# Patient Record
Sex: MALE | URBAN - METROPOLITAN AREA
[De-identification: names, ages, dates, MRNs, and addresses within clinical notes are randomized per-mention and may not be internally consistent; named-entity substitution may affect disease eponyms.]

---

## 2019-03-18 ENCOUNTER — HOSPITAL ENCOUNTER (OUTPATIENT)
Dept: PHYSICAL THERAPY | Age: 74
Discharge: HOME | End: 2019-03-18
Payer: MEDICARE

## 2019-03-18 PROCEDURE — 97163 PT EVAL HIGH COMPLEX 45 MIN: CPT

## 2019-03-18 PROCEDURE — 97112 NEUROMUSCULAR REEDUCATION: CPT

## 2019-03-18 NOTE — THERAPY EVALUATION
Reg Aparicio  : 1945  [place]  [place] [address] at [place] [address] [facility], [address] [place] [address],[address], [place] [address].  [phone]   [phone]        OUTPATIENT PHYSICAL THERAPY:Initial Assessment 3/18/2019   ICD-10: Treatment Diagnosis: Difficulty in walking, not elsewhere classified (R26.2)  Precautions/Allergies:   Patient has no allergy information on record. MD Orders: Evaluate and treat  MEDICAL/REFERRING DIAGNOSIS:  Dizziness and giddiness [R42]   DATE OF ONSET: Several months ago   REFERRING PHYSICIAN: Baldemar Bradshaw MD  RETURN PHYSICIAN APPOINTMENT: unknown      INITIAL ASSESSMENT:  Mr. Che Benavides presents with vertigo, imbalance, and difficulty walking. Patient is at higher fall risk with daily activities based on score received on Dynamic Gait Index. Patient would benefit from skilled physical therapy to address problems and goals. Thank you for this referral.     PROBLEM LIST (Impacting functional limitations):  1. Decreased Ambulation Ability/Technique  2. Decreased Balance  3. Increased vertigo INTERVENTIONS PLANNED: (Treatment may consist of any combination of the following)  1. Balance Exercise  2. Gait Training  3. Home Exercise Program (HEP)  4. Habituation exercises   TREATMENT PLAN:  Effective Dates: 3/18/2019 TO 2019 (90 days). Frequency/Duration: 1-2 times a week for 90 Day(s)  GOALS: (Goals have been discussed and agreed upon with patient.)  Short-Term Functional Goals: Time Frame: 30 days   1. Patient will be independent with home exercise program to improve balance and decrease vertigo. Discharge Goals: Time Frame: 90 days   1. Patient will increase score on Dynamic Gait Index to greater than or equal to 22/24 demonstrating improved balance and decreased fall risk with daily activities.     2. Patient will have no falls on condition 5 of SMART Equitest Sensory Organization test indicating improved vestibular function. 3. Patient will report improved stability with daily activities. OUTCOME MEASURE:   Tool Used: Dynamic Gait Index  Score:  Initial: 19/24 Most Recent: X/24 (Date: -- )   Interpretation of Score: Each section is scored on a 0-3 scale, 0 representing the patients inability to perform the task and 3 representing independence. The scores of each section are added together for a total score of 24. Any score below 19 indicates increased risk for falls. MEDICAL NECESSITY:   · Patient is expected to demonstrate progress in balance and vertigo to improve safety during activities of daily living. REASON FOR SERVICES/OTHER COMMENTS:  · Patient continues to require skilled intervention due to higher fall risk with daily activities. Total Duration:  PT Patient Time In/Time Out  Time In: 0900  Time Out: 0945    Rehabilitation Potential For Stated Goals: Excellent  Regarding Lucila Soria's therapy, I certify that the treatment plan above will be carried out by a therapist or under their direction. Thank you for this referral,  Molly Andersen, PT     Referring Physician Signature: Vinny Byers MD _______________________________ Date _____________     PAIN/SUBJECTIVE:   Initial: Pain Intensity 1: 0  Post Session:  0/10   HISTORY:   History of Injury/Illness (Reason for Referral):  Patient has Meniere's disease. Patient reports having a flare up over the past couple of months. Patient has had no falls. Patient uses no assistive device for ambulation. Patient reports feeling like he is \"falling down\". Patient rates current dizziness as 0/10. Patient complains mainly of imbalance. Patient reports tinnitus in left ear. Patient enjoys fly fishing. Past Medical History/Comorbidities:   Peripheral neuropathy, HTN, thyroid, kidney stone removed, blood clot removed. Social History/Living Environment:     Lives with family. Lives in a one story home.   Patient has three steps with a railing to enter. Prior Level of Function/Work/Activity:  Independent. Retired  Dominant Side:         RIGHT  Personal Factors:          Sex:  male        Age:  68 y.o. Ambulatory/Rehab Services H2 Model Falls Risk Assessment   Risk Factors:       (1)  Dizziness/Vertigo Ability to Rise from Chair:       (0)  Ability to rise in a single movement   Falls Prevention Plan:       No modifications necessary   Total: (5 or greater = High Risk): 1   ©2010 [facility] [employer]. All Rights Reserved. [facility] in the [place] Patent #[id number]. Federal Law prohibits the replication, distribution or use without written permission from [facility] [employer]   Current Medications:     Synthroid, HCTZ, blood pressure medication, and statin drug. Date Last Reviewed:  3/18/2019   Number of Personal Factors/Comorbidities that affect the Plan of Care: 3+: HIGH COMPLEXITY   EXAMINATION:   Postural Control & Balance:  · Rizzo Balance Scale:  Not tested. · Dynamic Gait Index:  19/24.   (A score less than or equal to19/24 is abnormal and predictive of falls)     · Unblab Sensory Organization Test:  Objective findings indicate Normal use of somatosensory, Normal use of visual, & Decreased use of vestibular inputs to balance. Center of gravity is normal.  See scanned report. Patient fell on condition 5 of this test.    · Sensation: Decreased left foot. Oculomotor Exam:  · Eye Range of Motion:  full range of motion  · Cervical Range of Motion:   full range of motion  · Spontaneous nystagmus:  NO  · Gaze holding nystagmus:  NO  · Smooth Pursuit:      []Smooth Eye Movements    []Delayed Eye Movements     [x]Within Normal Limits     []Other(comment): · Voluntary Saccades:      []Smooth Eye Movements    []Delayed Eye Movements     [x]Within Normal Limits     []Other(comment):   Vestibular Ocular Reflex Testing:  · Dynamic Visual Acuity Test:  Not tested. · Head Thrust Test: Negative to the right.  Negative to the left. Body Structures Involved:  1. Nerves  2. Eyes and Ears  3. Muscles Body Functions Affected:  1. Neuromusculoskeletal Activities and Participation Affected:  1. General Tasks and Demands  2. Mobility  3. Interpersonal Interactions and Relationships  4.  Community, Social and [place]   Number of elements (examined above) that affect the Plan of Care: 4+: HIGH COMPLEXITY   CLINICAL PRESENTATION:   Presentation: Evolving clinical presentation with unstable and unpredictable characteristics: HIGH COMPLEXITY   CLINICAL DECISION MAKING:   Use of outcome tool(s) and clinical judgement create a POC that gives a: Difficult prediction of patient's progress: HIGH COMPLEXITY

## 2019-03-18 NOTE — PROGRESS NOTES
Anand Camera  : 1945  [place]  [place]: [place] [address] [place] [address] at [address] [facility] [address], [address] [facility] [address], [facility] [address]  [phone]   [phone]     OUTPATIENT PHYSICAL THERAPY: Daily Treatment Note 3/18/2019  Pre-treatment Symptoms/Complaints:  Vertigo, imbalance, and difficulty walking   Pain: Initial: Pain Intensity 1: 0  Post Session:  0/10   Medications Last Reviewed:  3/18/2019  Updated Objective Findings:  See evaluation note from today   TREATMENT:   NEUROMUSCULAR RE-EDUCATION: (15 minutes):  Exercise/activities per grid below to improve balance and vertigo. Required minimal verbal cues to promote dynamic balance in standing. Date:  3/18/2019 Date:   Date:     Activity/Exercise Parameters Parameters Parameters   Walking with horizontal head turns 2 laps     Walking with vertical head turns 2 laps     Circles right/circles left 2 reps     Feet together Eyes closed                           [employer] Portal  Treatment/Session Summary:    · Response to Treatment:  Patient reports increased vertigo, right turns greater than left turns. Vertigo decreased quickly with rest. .  · Communication/Consultation:  None today  · Equipment provided today:  None today  · Recommendations/Intent for next treatment session: Next visit will focus on balance exercises and habituation exercises.   Treatment Plan of Care Effective Dates:  3/18/2019 to 2019  Total Treatment Billable Duration:  15 minutes  PT Patient Time In/Time Out  Time In: 0900  Time Out: Mahsa 9462, PT    Future Appointments   Date Time Provider Adair Canchola   3/25/2019  9:00 AM Taran Negron, PT [facility]   2019  9:00 AM Selina Dean, PT [facility]   4/15/2019  9:00 AM Taran Negron PT [facility]   2019  9:00 AM Selina Dean, PT [facility]   2019  9:00 AM Selina Dean, PT [facility] SFE

## 2019-03-25 ENCOUNTER — HOSPITAL ENCOUNTER (OUTPATIENT)
Dept: PHYSICAL THERAPY | Age: 74
Discharge: HOME | End: 2019-03-25
Payer: MEDICARE

## 2019-03-25 PROCEDURE — 97112 NEUROMUSCULAR REEDUCATION: CPT

## 2019-03-25 NOTE — PROGRESS NOTES
Toni Kinsey  : 1945  [place]  [place]: [place] [address] [facility] at [place] [address] [facility], [address], [place] [address].  [phone]   [phone]     OUTPATIENT PHYSICAL THERAPY: Daily Treatment Note 3/25/2019  Pre-treatment Symptoms/Complaints:  \"I have been doing my exercises. No dizziness today\". Pain: Initial: Pain Intensity 1: 0  Post Session:  0/10   Medications Last Reviewed:  3/25/2019  Updated Objective Findings:  None Today   TREATMENT:   NEUROMUSCULAR RE-EDUCATION: (40 minutes):  Exercise/activities per grid below to improve balance and dizziness. Required minimal verbal cues to promote dynamic balance in standing. Balance/Vestibular Treatment:   Activity   Date  3/25/19 Date Date Date Date Date   Activity/Exercise   Sets/reps/equipment Sets/reps/  equipment Sets/reps/  equipment Sets/reps/  equipment Sets/reps/  equipment Sets/reps/  equipment   Walking with head turns     4 laps        Walking with head up & down     4 laps        Walking with head diagonals   4 laps        Step taps             Marching           Sidestepping           Crossovers           [place]           Walking  backwards     4 laps        Tandem walking   4 laps        Weaving in/out of cones             Picking up cones             Sports cord             [employer] ball           Figure 8s    2x3 reps        Circles right/left   2x3 reps        Walking with 360 degree turns   2x2 reps        Spirals   2 reps        Weight shifting:    Left & Right     Tiltboard        Weight shifting: Forward & Backward      Tiltboard        Static Standing Balance     Tiltboard/sanddune with head turns up/down and left/right; eyes closed         Standing with feet apart             Standing with feet together             Standing with feet semitandem           Standing with feet tandem   Eyes open. eyes closed        Single leg stance   Eyes open        X1/X2 Viewing exercises             Hallpike-Be testing for BPPV (Benign Paroxysmal Positional Vertigo)             Sotelo-Daroff exercises           Canalith Repositioning treatment/Epley Maneuver  for BPPV (Benign Paroxysmal Positional Vertigo)           Smart Equitest Training: See scanned report. [employer]  Treatment/Session Summary:    · Response to Treatment:  Patient progressing well with therapy. Patient reports increased dizziness with spirals and walking turning 360s. Dizziness decreased with rest.  Added walking diagonal head turns, walking turning 360s, and spirals to home exercise program..  · Communication/Consultation:  None today  · Equipment provided today:  None today  · Recommendations/Intent for next treatment session: Next visit will focus on balance exercises and habituation exercises.   Treatment Plan of Care Effective Dates:  3/18/2019 to 6/16/2019  Total Treatment Billable Duration:  40 minutes  PT Patient Time In/Time Out  Time In: 8785  Time Out: Mahsa 9462, PT    Future Appointments   Date Time Provider Adair Canchola   4/8/2019  9:00 AM Asad Hurst PT [facility]   4/15/2019  9:00 AM Ru Dean, PT [facility]   4/22/2019  9:00 AM Asad Hurst PT [facility]   4/29/2019  9:00 AM Ru Dean, PT [facility]

## 2019-04-08 ENCOUNTER — HOSPITAL ENCOUNTER (OUTPATIENT)
Dept: PHYSICAL THERAPY | Age: 74
Discharge: HOME | End: 2019-04-08
Payer: MEDICARE

## 2019-04-08 PROCEDURE — 97112 NEUROMUSCULAR REEDUCATION: CPT

## 2019-04-08 NOTE — PROGRESS NOTES
Susanne Credit  : 1945  [place]  [place]: [place] [address] at [place] [address] [facility], [address], [place] [address].  [phone]   [phone]     OUTPATIENT PHYSICAL THERAPY: Daily Treatment Note 2019  Pre-treatment Symptoms/Complaints:  \"I have been doing my exercises. No dizziness today. I noticed I have better balance when I fish in a boat\". Pain: Initial: Pain Intensity 1: 0  Post Session:  0/10   Medications Last Reviewed:  2019  Updated Objective Findings:  None Today   TREATMENT:   NEUROMUSCULAR RE-EDUCATION: (40 minutes):  Exercise/activities per grid below to improve balance and dizziness. Required minimal verbal cues to promote dynamic balance in standing. Balance/Vestibular Treatment:   Activity   Date  3/25/19 Date  19 Date Date Date Date   Activity/Exercise   Sets/reps/equipment Sets/reps/  equipment Sets/reps/  equipment Sets/reps/  equipment Sets/reps/  equipment Sets/reps/  equipment   Walking with head turns     4 laps 4 laps       Walking with head up & down     4 laps 4 laps       Walking with head diagonals   4 laps 4 laps       Step taps             Marching           Sidestepping           Crossovers           [place]           Walking  backwards     4 laps 4 laps       Tandem walking   4 laps 4 laps       Weaving in/out of cones             Picking up cones             Sports cord             [employer] ball           Figure 8s    2x3 reps 2x3 reps       Circles right/left   2x3 reps 2x3 reps       Walking with 360 degree turns   2x2 reps 2x2 3eps       Spirals   2 reps 2 reps       Weight shifting:    Left & Right     Tiltboard Tiltboard       Weight shifting:   Forward & Backward      Tiltboard Tiltboard       Static Standing Balance     Tiltboard/sanddune with head turns up/down and left/right; eyes closed  Tiltboard/sanddune with head turns up/down and left/right; eyes closed        Standing with feet apart             Standing with feet together             Standing with feet semitandem           Standing with feet tandem   Eyes open. eyes closed Eyes open/eyes closed       Single leg stance   Eyes open Eyes open       X1/X2 Viewing exercises             Hallpike-[place] testing for BPPV (Benign Paroxysmal Positional Vertigo)             Sotelo-Daroff exercises           Canalith Repositioning treatment/Epley Maneuver  for BPPV (Benign Paroxysmal Positional Vertigo)           Smart Equitest Training: See scanned report. [employer] Portal  Treatment/Session Summary:    · Response to Treatment:  Patient tolerating exercises well. Patient had no compliants of increased dizziness during treatment. · Communication/Consultation:  None today  · Equipment provided today:  None today  · Recommendations/Intent for next treatment session: Next visit will focus on balance exercises and habituation exercises.   Treatment Plan of Care Effective Dates:  3/18/2019 to 6/16/2019  Total Treatment Billable Duration:  40 minutes  PT Patient Time In/Time Out  Time In: 0900  Time Out: [address] [facility],     Future Appointments   Date Time Provider Adair Ferreirai   4/15/2019  9:00 AM Zhanna Jin PT [facility]   4/22/2019  9:00 AM Chaka Dean PT [facility]   4/29/2019  9:00 AM Chaka Dean, PT [facility]

## 2019-04-15 ENCOUNTER — HOSPITAL ENCOUNTER (OUTPATIENT)
Dept: PHYSICAL THERAPY | Age: 74
Discharge: HOME | End: 2019-04-15
Payer: MEDICARE

## 2019-04-15 PROCEDURE — 97112 NEUROMUSCULAR REEDUCATION: CPT

## 2019-04-15 NOTE — PROGRESS NOTES
Jamesshama Comment  : 1945  [place]  [place]: [place] [address] at [place] [address] [facility], [address], [place] [address].  [phone]   [phone]     OUTPATIENT PHYSICAL THERAPY: Daily Treatment Note 4/15/2019  Pre-treatment Symptoms/Complaints:  \"Doing well. No falls. No dizziness\". Pain: Initial: Pain Intensity 1: 0  Post Session:  0/10   Medications Last Reviewed:  4/15/2019  Updated Objective Findings:  None Today   TREATMENT:   NEUROMUSCULAR RE-EDUCATION: (45 minutes):  Exercise/activities per grid below to improve balance and habituation. Required minimal verbal cues to promote dynamic balance in standing. Balance/Vestibular Treatment:   Activity   Date  3/25/19 Date  19 Date  4/15/19 Date Date Date   Activity/Exercise   Sets/reps/equipment Sets/reps/  equipment Sets/reps/  equipment Sets/reps/  equipment Sets/reps/  equipment Sets/reps/  equipment   Walking with head turns     4 laps 4 laps 4 laps      Walking with head up & down     4 laps 4 laps 4 laps      Walking with head diagonals   4 laps 4 laps 4 laps      Step taps             Marching           Sidestepping           Crossovers           [place]           Walking  backwards     4 laps 4 laps 4 laps      Tandem walking   4 laps 4 laps 4 laps      Weaving in/out of cones             Picking up cones             Sports cord             [employer] ball           Figure 8s    2x3 reps 2x3 reps 2x4 reps      Circles right/left   2x3 reps 2x3 reps 2x4 reps      Walking with 360 degree turns   2x2 reps 2x3 reps 2x3 reps      Spirals   2 reps 2 reps 2 reps      Weight shifting:    Left & Right     Tiltboard Tiltboard Tiltboard eyes open/ eyes closed      Weight shifting:   Forward & Backward      Tiltboard Tiltboard Tiltboard eyes open/ eyes closed      Static Standing Balance     Tiltboard/sanddune with head turns up/down and left/right; eyes closed  Tiltboard/sanddune with head turns up/down and left/right; eyes closed  Tiltboard/tuning board with head turns up/down and left/right and diagonals; eyes closed      Standing with feet apart             Standing with feet together             Standing with feet semitandem           Standing with feet tandem   Eyes open. eyes closed Eyes open/eyes closed Eyes open/ eyes closed      Single leg stance   Eyes open Eyes open Eyes open      X1/X2 Viewing exercises             Hallpike-[place]ris testing for BPPV (Benign Paroxysmal Positional Vertigo)             Sotelo-Daroff exercises           Canalith Repositioning treatment/Epley Maneuver  for BPPV (Benign Paroxysmal Positional Vertigo)           Smart Equitest Training: See scanned report. [employer] Portal  Treatment/Session Summary:    · Response to Treatment:  Patient progressing well with therapy. Patient had no complaints of increased dizziness. Will reassess patient next appointment for possible discharge. · Communication/Consultation:  None today  · Equipment provided today:  None today  · Recommendations/Intent for next treatment session: Next visit will focus on balance exercises and habituation exercises. Reassess next appointment.    Treatment Plan of Care Effective Dates:  3/18/2019 to 6/16/2019  Total Treatment Billable Duration:  45 minutes  PT Patient Time In/Time Out  Time In: 0845  Time Out: [address] PT    Future Appointments   Date Time Provider Adair Canchola   4/22/2019  9:00 AM Nilda Vann, PT [facility]   4/29/2019  9:00 AM Azra Dean, PT [facility]

## 2019-04-22 ENCOUNTER — HOSPITAL ENCOUNTER (OUTPATIENT)
Dept: PHYSICAL THERAPY | Age: 74
Discharge: HOME | End: 2019-04-22
Payer: MEDICARE

## 2019-04-22 PROCEDURE — 97112 NEUROMUSCULAR REEDUCATION: CPT

## 2019-04-22 NOTE — PROGRESS NOTES
Mone Ijeoma  : 1945  [place]  [place]: [place] [address] at [place] [id number] [facility], [address] [place] [address],[address], [facility] [address]  [phone]   [phone]     OUTPATIENT PHYSICAL THERAPY: Daily Treatment Note 2019  Pre-treatment Symptoms/Complaints: \"No complaints\". Pain: Initial: Pain Intensity 1: 0  Post Session:  0/10   Medications Last Reviewed:  2019  Updated Objective Findings:  See discharge note   TREATMENT:   NEUROMUSCULAR RE-EDUCATION: (40 minutes):  Exercise/activities per grid below to improve balance and vertigo. Required minimal verbal cues to promote dynamic balance in standing. Balance/Vestibular Treatment:   Activity   Date  3/25/19 Date  19 Date  4/15/19 Date  19 Date Date   Activity/Exercise   Sets/reps/equipment Sets/reps/  equipment Sets/reps/  equipment Sets/reps/  equipment Sets/reps/  equipment Sets/reps/  equipment   Walking with head turns     4 laps 4 laps 4 laps 4 laps     Walking with head up & down     4 laps 4 laps 4 laps 4 laps     Walking with head diagonals   4 laps 4 laps 4 laps 4 laps     Step taps             Marching           Sidestepping           Crossovers           [place]           Walking  backwards     4 laps 4 laps 4 laps 4 laps     Tandem walking   4 laps 4 laps 4 laps 4 laps     Weaving in/out of cones             Picking up cones             Sports cord             [employer] ball           Figure 8s    2x3 reps 2x3 reps 2x4 reps 2x4 reps     Circles right/left   2x3 reps 2x3 reps 2x4 reps 2x4 reps     Walking with 360 degree turns   2x2 reps 2x3 reps 2x3 reps X     Spirals   2 reps 2 reps 2 reps 2 reps     Weight shifting:    Left & Right     Tiltboard Tiltboard Tiltboard eyes open/ eyes closed X     Weight shifting:   Forward & Backward      Tiltboard Tiltboard Tiltboard eyes open/ eyes closed Tiltboard eyes open     Static Standing Balance Tiltboard/sanddune with head turns up/down and left/right; eyes closed  Tiltboard/sanddune with head turns up/down and left/right; eyes closed  Tiltboard/tuning board with head turns up/down and left/right and diagonals; eyes closed Tiltboard/tuning board with head turns up/down and left/right and diagonals; eyes closed     Standing with feet apart             Standing with feet together             Standing with feet semitandem           Standing with feet tandem   Eyes open. eyes closed Eyes open/eyes closed Eyes open/ eyes closed X     Single leg stance   Eyes open Eyes open Eyes open X     X1/X2 Viewing exercises             Hallpike-[place] testing for BPPV (Benign Paroxysmal Positional Vertigo)             Sotelo-Daroff exercises           Canalith Repositioning treatment/Epley Maneuver  for BPPV (Benign Paroxysmal Positional Vertigo)           Smart Equitest Training: See scanned report. [employer]  Treatment/Session Summary:    · Response to Treatment:  Patient demonstrates improved balance since beginning therapy. Patient has met all of his goals. Patient will continue exercises independently. · Communication/Consultation:  None today  · Equipment provided today:  None today  · Recommendations/Intent for next treatment session: Patient discharged from physical therapy.    Treatment Plan of Care Effective Dates:  3/18/2019 to 6/16/2019  Total Treatment Billable Duration:  40 minutes  PT Patient Time In/Time Out  Time In: 0900  Time Out: 4111 Ruth Aleman PT    Future Appointments   Date Time Provider Adair Canchola   4/29/2019  9:00 AM Jay Dean, WILLI [facility] SFE

## 2019-04-22 NOTE — THERAPY DISCHARGE
Imelda Lala  : 1945  [place]  [place] [address] [place] [id number] [facility], [address] [place] [address],[address], [facility] [address].  [phone]   [phone]        OUTPATIENT PHYSICAL THERAPY:Discharge Summary 2019   ICD-10: Treatment Diagnosis: Difficulty in walking, not elsewhere classified (R26.2)  Precautions/Allergies:   Patient has no allergy information on record. MD Orders: Evaluate and treat  MEDICAL/REFERRING DIAGNOSIS:  Dizziness and giddiness [R42]   DATE OF ONSET: Several months ago   REFERRING PHYSICIAN: Libertad Pham MD  RETURN PHYSICIAN APPOINTMENT: unknown      INITIAL ASSESSMENT:  Mr. Atilio Vazquez presents with vertigo, imbalance, and difficulty walking. Patient is at higher fall risk with daily activities based on score received on Dynamic Gait Index. Patient would benefit from skilled physical therapy to address problems and goals. Thank you for this referral.    Discharge note:  Patient attended five scheduled physical therapy appointments from 3/18/2019 to 2019. Patient has been motivated and compliant with home exercise program.  Patient demonstrates improved balance since beginning therapy. Patient has no complaints of vertigo or dizziness during exercises. Patient has met most of his goals. Patient discharged from physical therapy. Thank you for this referral.     PROBLEM LIST (Impacting functional limitations):  1. Decreased Ambulation Ability/Technique  2. Decreased Balance  3. Increased vertigo INTERVENTIONS PLANNED: (Treatment may consist of any combination of the following)  1. Balance Exercise  2. Gait Training  3. Home Exercise Program (HEP)  4. Habituation exercises   TREATMENT PLAN:  Effective Dates: 3/18/2019 TO 2019 (90 days). Frequency/Duration: Patient discharged due to meeting most of his goals.     GOALS: (Goals have been discussed and agreed upon with patient.)  Short-Term Functional Goals: Time Frame: 30 days   1. Patient will be independent with home exercise program to improve balance and decrease vertigo. Goal met. Discharge Goals: Time Frame: 90 days   1. Patient will increase score on Dynamic Gait Index to greater than or equal to 22/24 demonstrating improved balance and decreased fall risk with daily activities. Goal met. 2. Patient will have no falls on condition 5 of SMART Equitest Sensory Organization test indicating improved vestibular function. Goal not met. 3. Patient will report improved stability with daily activities. Goal met. OUTCOME MEASURE:   Tool Used: Dynamic Gait Index  Score:  Initial: 19/24 Most Recent: 24/24 (Date: 4- )   Interpretation of Score: Each section is scored on a 0-3 scale, 0 representing the patients inability to perform the task and 3 representing independence. The scores of each section are added together for a total score of 24. Any score below 19 indicates increased risk for falls. Total Duration:  PT Patient Time In/Time Out  Time In: 0900  Time Out: 0940    Rehabilitation Potential For Stated Goals: Excellent       PAIN/SUBJECTIVE:   Initial: Pain Intensity 1: 0  Post Session:  0/10   HISTORY:   History of Injury/Illness (Reason for Referral):  Patient has Meniere's disease. Patient reports having a flare up over the past couple of months. Patient has had no falls. Patient uses no assistive device for ambulation. Patient reports feeling like he is \"falling down\". Patient rates current dizziness as 0/10. Patient complains mainly of imbalance. Patient reports tinnitus in left ear. Patient enjoys fly fishing. Past Medical History/Comorbidities:   Peripheral neuropathy, HTN, thyroid, kidney stone removed, blood clot removed. Social History/Living Environment:     Lives with family. Lives in a one story home. Patient has three steps with a railing to enter.    Prior Level of Function/Work/Activity:  Independent. Retired  Dominant Side:         RIGHT  Personal Factors:          Sex:  male        Age:  68 y.o. Ambulatory/Rehab Services H2 Model Falls Risk Assessment   Risk Factors:       (1)  Dizziness/Vertigo Ability to Rise from Chair:       (0)  Ability to rise in a single movement   Falls Prevention Plan:       No modifications necessary   Total: (5 or greater = High Risk): 1   ©2010 [facility] [employer]. All Rights Reserved. [facility] Patent #[id number]. Federal Law prohibits the replication, distribution or use without written permission from [facility] [place]   Current Medications:     Synthroid, HCTZ, blood pressure medication, and statin drug. Date Last Reviewed:  4/22/2019   Number of Personal Factors/Comorbidities that affect the Plan of Care: 3+: HIGH COMPLEXITY   EXAMINATION:   Postural Control & Balance:  · Rizzo Balance Scale:  Not tested. · Dynamic Gait Index:  24/24.   (A score less than or equal to19/24 is abnormal and predictive of falls) . Score improved from 19/24 on initial evaluation. · inthinct Sensory Organization Test:  Objective findings indicate Normal use of somatosensory, Normal use of visual, & Normal use of vestibular inputs to balance. Center of gravity is normal.  See scanned report. Patient improved vestibular inputs compared to initial evaluation. · Sensation: Decreased left foot. Oculomotor Exam:  · Eye Range of Motion:  full range of motion  · Cervical Range of Motion:   full range of motion  · Spontaneous nystagmus:  NO  · Gaze holding nystagmus:  NO  · Smooth Pursuit:      []Smooth Eye Movements    []Delayed Eye Movements     [x]Within Normal Limits     []Other(comment): · Voluntary Saccades:      []Smooth Eye Movements    []Delayed Eye Movements     [x]Within Normal Limits     []Other(comment):   Vestibular Ocular Reflex Testing:  · Dynamic Visual Acuity Test:  Not tested.   · Head Thrust Test: Negative to the right. Negative to the left. Body Structures Involved:  1. Nerves  2. Eyes and Ears  3. Muscles Body Functions Affected:  1. Neuromusculoskeletal Activities and Participation Affected:  1. General Tasks and Demands  2. Mobility  3. Interpersonal Interactions and Relationships  4.  Community, Social and [place]   Number of elements (examined above) that affect the Plan of Care: 4+: HIGH COMPLEXITY   CLINICAL PRESENTATION:   Presentation: Evolving clinical presentation with unstable and unpredictable characteristics: HIGH COMPLEXITY   CLINICAL DECISION MAKING:   Use of outcome tool(s) and clinical judgement create a POC that gives a: Difficult prediction of patient's progress: HIGH COMPLEXITY

## 2019-04-29 ENCOUNTER — APPOINTMENT (OUTPATIENT)
Dept: PHYSICAL THERAPY | Age: 74
End: 2019-04-29
Payer: MEDICARE